# Patient Record
Sex: MALE
[De-identification: names, ages, dates, MRNs, and addresses within clinical notes are randomized per-mention and may not be internally consistent; named-entity substitution may affect disease eponyms.]

---
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
## 2020-01-09 ENCOUNTER — RX ONLY (OUTPATIENT)
Age: 20
Setting detail: RX ONLY
End: 2020-01-09

## 2020-01-09 RX ORDER — CLINDAMYCIN PHOSPHATE AND BENZOYL PEROXIDE 10; 37.5 MG/G; MG/G
GEL TOPICAL QAM
Qty: 1 | Refills: 6 | Status: ERX

## 2022-06-23 ENCOUNTER — APPOINTMENT (RX ONLY)
Dept: URBAN - NONMETROPOLITAN AREA CLINIC 6 | Facility: CLINIC | Age: 22
Setting detail: DERMATOLOGY
End: 2022-06-23

## 2022-06-23 DIAGNOSIS — D22 MELANOCYTIC NEVI: ICD-10-CM

## 2022-06-23 DIAGNOSIS — Z71.89 OTHER SPECIFIED COUNSELING: ICD-10-CM

## 2022-06-23 PROBLEM — D48.5 NEOPLASM OF UNCERTAIN BEHAVIOR OF SKIN: Status: ACTIVE | Noted: 2022-06-23

## 2022-06-23 PROCEDURE — 11102 TANGNTL BX SKIN SINGLE LES: CPT

## 2022-06-23 PROCEDURE — ? BIOPSY BY SHAVE METHOD

## 2022-06-23 PROCEDURE — ? COUNSELING

## 2022-06-23 ASSESSMENT — LOCATION ZONE DERM: LOCATION ZONE: ARM

## 2022-06-23 ASSESSMENT — LOCATION DETAILED DESCRIPTION DERM: LOCATION DETAILED: LEFT VENTRAL MEDIAL PROXIMAL FOREARM

## 2022-06-23 ASSESSMENT — LOCATION SIMPLE DESCRIPTION DERM: LOCATION SIMPLE: LEFT FOREARM

## 2022-06-23 NOTE — PROCEDURE: BIOPSY BY SHAVE METHOD
Silver Nitrate Text: The wound bed was treated with silver nitrate after the biopsy was performed.
Render Path Notes In Note?: No
Notification Instructions: Patient will be notified of biopsy results. However, patient instructed to call the office if not contacted within 2 weeks.
Biopsy Method: Personna blade
Dressing: bandage
Electrodesiccation Text: The wound bed was treated with electrodesiccation after the biopsy was performed.
Electrodesiccation And Curettage Text: The wound bed was treated with electrodesiccation and curettage after the biopsy was performed.
Type Of Destruction Used: Electrodesiccation
Cryotherapy Text: The wound bed was treated with cryotherapy after the biopsy was performed.
Information: Selecting Yes will display possible errors in your note based on the variables you have selected. This validation is only offered as a suggestion for you. PLEASE NOTE THAT THE VALIDATION TEXT WILL BE REMOVED WHEN YOU FINALIZE YOUR NOTE. IF YOU WANT TO FAX A PRELIMINARY NOTE YOU WILL NEED TO TOGGLE THIS TO 'NO' IF YOU DO NOT WANT IT IN YOUR FAXED NOTE.
Billing Type: Third-Party Bill
Hemostasis: Drysol
Path Notes (To The Dermatopathologist): Please check margins.
Anesthesia Type: 1% lidocaine with epinephrine and a 1:10 solution of 8.4% sodium bicarbonate
Wound Care: Vaseline
Consent: Written consent was obtained and risks were reviewed including but not limited to scarring, infection, bleeding, scabbing, incomplete removal, nerve damage and allergy to anesthesia.
Depth Of Biopsy: dermis
Was A Bandage Applied: Yes
Detail Level: Detailed
Post-Care Instructions: I reviewed with the patient in detail post-care instructions. Patient is to keep the biopsy site dry overnight, and then wash with soap and water.  Apply Vaseline and a new bandaid twice daily until healed.
Biopsy Type: H and E
Size Of Lesion In Cm: 0.6
X Size Of Lesion In Cm: 0
Anesthesia Volume In Cc: 0.5
Curettage Text: The wound bed was treated with curettage after the biopsy was performed.

## 2022-08-08 ENCOUNTER — APPOINTMENT (RX ONLY)
Dept: URBAN - NONMETROPOLITAN AREA CLINIC 6 | Facility: CLINIC | Age: 22
Setting detail: DERMATOLOGY
End: 2022-08-08

## 2022-08-08 DIAGNOSIS — L23.7 ALLERGIC CONTACT DERMATITIS DUE TO PLANTS, EXCEPT FOOD: ICD-10-CM

## 2022-08-08 DIAGNOSIS — Z71.89 OTHER SPECIFIED COUNSELING: ICD-10-CM

## 2022-08-08 PROCEDURE — ? PRESCRIPTION

## 2022-08-08 PROCEDURE — ? COUNSELING

## 2022-08-08 PROCEDURE — ? TREATMENT REGIMEN

## 2022-08-08 PROCEDURE — 99213 OFFICE O/P EST LOW 20 MIN: CPT

## 2022-08-08 RX ORDER — TRIAMCINOLONE ACETONIDE 1 MG/G
CREAM TOPICAL
Qty: 80 | Refills: 3 | Status: ERX | COMMUNITY
Start: 2022-08-08

## 2022-08-08 RX ADMIN — TRIAMCINOLONE ACETONIDE: 1 CREAM TOPICAL at 00:00

## 2022-08-08 ASSESSMENT — LOCATION SIMPLE DESCRIPTION DERM
LOCATION SIMPLE: RIGHT PRETIBIAL REGION
LOCATION SIMPLE: RIGHT CALF
LOCATION SIMPLE: LEFT CALF

## 2022-08-08 ASSESSMENT — LOCATION DETAILED DESCRIPTION DERM
LOCATION DETAILED: RIGHT PROXIMAL PRETIBIAL REGION
LOCATION DETAILED: RIGHT DISTAL CALF
LOCATION DETAILED: LEFT DISTAL CALF

## 2022-08-08 ASSESSMENT — LOCATION ZONE DERM: LOCATION ZONE: LEG

## 2022-08-08 NOTE — PROCEDURE: TREATMENT REGIMEN
Otc Regimen: Allegra QAM, Zyrtec QHS
Detail Level: Zone
Continue Regimen: Zanfel wash as needed
Initiate Treatment: Triamcinolone cream twice daily two weeks on, 1 week off. Repeat as needed

## 2022-08-08 NOTE — HPI: RASH
What Type Of Note Output Would You Prefer (Optional)?: Standard Output
How Severe Is Your Rash?: moderate
Is This A New Presentation, Or A Follow-Up?: Rash
Additional History: Had engagement pics over the weekend and walked through some poison ivy.